# Patient Record
Sex: FEMALE | Race: BLACK OR AFRICAN AMERICAN | NOT HISPANIC OR LATINO | Employment: FULL TIME | URBAN - METROPOLITAN AREA
[De-identification: names, ages, dates, MRNs, and addresses within clinical notes are randomized per-mention and may not be internally consistent; named-entity substitution may affect disease eponyms.]

---

## 2019-03-07 ENCOUNTER — HOSPITAL ENCOUNTER (EMERGENCY)
Facility: HOSPITAL | Age: 39
Discharge: HOME | End: 2019-03-07
Attending: EMERGENCY MEDICINE
Payer: MEDICAID

## 2019-03-07 VITALS
DIASTOLIC BLOOD PRESSURE: 79 MMHG | BODY MASS INDEX: 29.86 KG/M2 | RESPIRATION RATE: 20 BRPM | TEMPERATURE: 100 F | HEART RATE: 100 BPM | SYSTOLIC BLOOD PRESSURE: 125 MMHG | WEIGHT: 158.19 LBS | HEIGHT: 61 IN

## 2019-03-07 DIAGNOSIS — J06.9 VIRAL UPPER RESPIRATORY INFECTION: Primary | ICD-10-CM

## 2019-03-07 LAB
INFLUENZA A, MOLECULAR: NEGATIVE
INFLUENZA B, MOLECULAR: NEGATIVE
SPECIMEN SOURCE: NORMAL

## 2019-03-07 PROCEDURE — 99284 EMERGENCY DEPT VISIT MOD MDM: CPT | Mod: ER

## 2019-03-07 PROCEDURE — 25000003 PHARM REV CODE 250: Mod: ER | Performed by: EMERGENCY MEDICINE

## 2019-03-07 PROCEDURE — 87502 INFLUENZA DNA AMP PROBE: CPT | Mod: ER

## 2019-03-07 RX ORDER — NAPROXEN 500 MG/1
500 TABLET ORAL
Status: COMPLETED | OUTPATIENT
Start: 2019-03-07 | End: 2019-03-07

## 2019-03-07 RX ORDER — NAPROXEN 500 MG/1
500 TABLET ORAL 2 TIMES DAILY PRN
Qty: 10 TABLET | Refills: 1 | Status: SHIPPED | OUTPATIENT
Start: 2019-03-07 | End: 2019-03-12

## 2019-03-07 RX ORDER — PROMETHAZINE HYDROCHLORIDE AND CODEINE PHOSPHATE 6.25; 1 MG/5ML; MG/5ML
5 SOLUTION ORAL EVERY 4 HOURS PRN
Qty: 120 ML | Refills: 0 | Status: SHIPPED | OUTPATIENT
Start: 2019-03-07 | End: 2019-03-14

## 2019-03-07 RX ORDER — ACETAMINOPHEN 500 MG
1000 TABLET ORAL
Status: COMPLETED | OUTPATIENT
Start: 2019-03-07 | End: 2019-03-07

## 2019-03-07 RX ADMIN — NAPROXEN 500 MG: 500 TABLET ORAL at 09:03

## 2019-03-07 RX ADMIN — ACETAMINOPHEN 1000 MG: 500 TABLET ORAL at 09:03

## 2019-03-07 NOTE — ED NOTES
Aaox3, skin warm and dry, resp unlabored and even. amb with with steady gait and lawrence well. C/o achy all over, headache , congestion and some vomiting interm since yest.no active vomiting while in triage.

## 2019-03-07 NOTE — ED PROVIDER NOTES
Encounter Date: 3/7/2019       History     Chief Complaint   Patient presents with    flu like symptoms.     cough, congestion, headache , achy all over since yest.      Mild flu-like symptoms since yesterday, did not get the flu vaccine.  No definite fever or dyspnea.  Mild to moderate cough.  Works as a , scheduled to work tomorrow, will need a note.  Has had tubal ligation.  No other complaints.      The history is provided by the patient. No  was used.     Review of patient's allergies indicates:  No Known Allergies  History reviewed. No pertinent past medical history.  History reviewed. No pertinent surgical history.  History reviewed. No pertinent family history.  Social History     Tobacco Use    Smoking status: Never Smoker    Smokeless tobacco: Never Used   Substance Use Topics    Alcohol use: No     Frequency: Never    Drug use: No     Review of Systems   Constitutional: Negative for activity change, fatigue and fever.   HENT: Negative for congestion, ear pain, facial swelling, nosebleeds, sinus pressure and sore throat.    Eyes: Negative for pain, discharge, redness and visual disturbance.   Respiratory: Positive for cough. Negative for choking, chest tightness, shortness of breath and wheezing.    Cardiovascular: Negative for chest pain, palpitations and leg swelling.   Gastrointestinal: Negative for abdominal distention, abdominal pain, nausea and vomiting.   Endocrine: Negative for heat intolerance, polydipsia and polyuria.   Genitourinary: Negative for difficulty urinating, dysuria, flank pain, hematuria and urgency.   Musculoskeletal: Positive for arthralgias and myalgias. Negative for back pain, gait problem and joint swelling.   Skin: Negative for color change and rash.   Allergic/Immunologic: Negative for environmental allergies and food allergies.   Neurological: Positive for headaches. Negative for dizziness, weakness and numbness.   Hematological:  Negative for adenopathy. Does not bruise/bleed easily.   Psychiatric/Behavioral: Negative for agitation and behavioral problems. The patient is not nervous/anxious.    All other systems reviewed and are negative.      Physical Exam     Initial Vitals [03/07/19 0917]   BP Pulse Resp Temp SpO2   125/79 100 20 99.8 °F (37.7 °C) --      MAP       --         Physical Exam    Nursing note and vitals reviewed.  Constitutional: She appears well-developed and well-nourished. She is not diaphoretic. No distress.   Mildly congested; mildly uncomfortable; mild cough   HENT:   Head: Normocephalic and atraumatic.   Mouth/Throat: No oropharyngeal exudate.   Eyes: Conjunctivae and EOM are normal. Pupils are equal, round, and reactive to light. Right eye exhibits no discharge. Left eye exhibits no discharge. No scleral icterus.   Neck: Normal range of motion. Neck supple. No thyromegaly present. No tracheal deviation present. No JVD present.   Cardiovascular: Normal rate, regular rhythm, normal heart sounds and intact distal pulses. Exam reveals no gallop and no friction rub.    No murmur heard.  Pulmonary/Chest: Breath sounds normal. No stridor. No respiratory distress. She has no wheezes. She has no rhonchi. She has no rales. She exhibits no tenderness.   Abdominal: Soft. Bowel sounds are normal. She exhibits no distension and no mass. There is no tenderness. There is no rebound and no guarding.   Musculoskeletal: Normal range of motion. She exhibits no edema or tenderness.   Neurological: She is alert and oriented to person, place, and time. She has normal strength.   Skin: Skin is warm and dry. No rash and no abscess noted. No erythema.   Psychiatric: She has a normal mood and affect. Her behavior is normal. Judgment and thought content normal.         ED Course   Procedures  Labs Reviewed   INFLUENZA A & B BY MOLECULAR          Imaging Results    None                               Clinical Impression:       1. Viral upper  respiratory infection            Disposition:   Disposition: Discharged  Condition: Stable                        Mart Canales MD  03/07/19 0951

## 2019-09-12 ENCOUNTER — HOSPITAL ENCOUNTER (OUTPATIENT)
Facility: HOSPITAL | Age: 39
Discharge: HOME | End: 2019-09-13
Attending: EMERGENCY MEDICINE | Admitting: FAMILY MEDICINE
Payer: MEDICAID

## 2019-09-12 DIAGNOSIS — R10.13 EPIGASTRIC PAIN: ICD-10-CM

## 2019-09-12 DIAGNOSIS — D21.9 FIBROIDS: ICD-10-CM

## 2019-09-12 DIAGNOSIS — K80.20 GALLSTONES: ICD-10-CM

## 2019-09-12 DIAGNOSIS — D64.9 ANEMIA, UNSPECIFIED TYPE: Primary | ICD-10-CM

## 2019-09-12 DIAGNOSIS — K80.50 BILIARY COLIC: ICD-10-CM

## 2019-09-12 LAB
ALBUMIN SERPL BCP-MCNC: 4.6 G/DL (ref 3.5–5.2)
ALP SERPL-CCNC: 78 U/L (ref 55–135)
ALT SERPL W/O P-5'-P-CCNC: 10 U/L (ref 10–44)
ANION GAP SERPL CALC-SCNC: 9 MMOL/L (ref 8–16)
ANISOCYTOSIS BLD QL SMEAR: ABNORMAL
AST SERPL-CCNC: 15 U/L (ref 10–40)
B-HCG UR QL: NEGATIVE
BASOPHILS # BLD AUTO: 0.04 K/UL (ref 0–0.2)
BASOPHILS NFR BLD: 0.3 % (ref 0–1.9)
BILIRUB SERPL-MCNC: 0.3 MG/DL (ref 0.1–1)
BILIRUB UR QL STRIP: NEGATIVE
BUN SERPL-MCNC: 7 MG/DL (ref 6–20)
CALCIUM SERPL-MCNC: 10.1 MG/DL (ref 8.7–10.5)
CHLORIDE SERPL-SCNC: 104 MMOL/L (ref 95–110)
CLARITY UR REFRACT.AUTO: CLEAR
CO2 SERPL-SCNC: 22 MMOL/L (ref 23–29)
COLOR UR AUTO: YELLOW
CREAT SERPL-MCNC: 0.7 MG/DL (ref 0.5–1.4)
DIFFERENTIAL METHOD: ABNORMAL
EOSINOPHIL # BLD AUTO: 0.1 K/UL (ref 0–0.5)
EOSINOPHIL NFR BLD: 0.6 % (ref 0–8)
ERYTHROCYTE [DISTWIDTH] IN BLOOD BY AUTOMATED COUNT: 22 % (ref 11.5–14.5)
EST. GFR  (AFRICAN AMERICAN): >60 ML/MIN/1.73 M^2
EST. GFR  (NON AFRICAN AMERICAN): >60 ML/MIN/1.73 M^2
GLUCOSE SERPL-MCNC: 104 MG/DL (ref 70–110)
GLUCOSE UR QL STRIP: NEGATIVE
HCT VFR BLD AUTO: 22.9 % (ref 37–48.5)
HGB BLD-MCNC: 6.1 G/DL (ref 12–16)
HGB UR QL STRIP: NEGATIVE
HYPOCHROMIA BLD QL SMEAR: ABNORMAL
KETONES UR QL STRIP: NEGATIVE
LEUKOCYTE ESTERASE UR QL STRIP: NEGATIVE
LIPASE SERPL-CCNC: 27 U/L (ref 4–60)
LYMPHOCYTES # BLD AUTO: 2.7 K/UL (ref 1–4.8)
LYMPHOCYTES NFR BLD: 19.9 % (ref 18–48)
MCH RBC QN AUTO: 15.4 PG (ref 27–31)
MCHC RBC AUTO-ENTMCNC: 26.6 G/DL (ref 32–36)
MCV RBC AUTO: 58 FL (ref 82–98)
MONOCYTES # BLD AUTO: 0.8 K/UL (ref 0.3–1)
MONOCYTES NFR BLD: 5.6 % (ref 4–15)
NEUTROPHILS # BLD AUTO: 9.9 K/UL (ref 1.8–7.7)
NEUTROPHILS NFR BLD: 73.6 % (ref 38–73)
NITRITE UR QL STRIP: NEGATIVE
OVALOCYTES BLD QL SMEAR: ABNORMAL
PH UR STRIP: 7 [PH] (ref 5–8)
PLATELET # BLD AUTO: 471 K/UL (ref 150–350)
PMV BLD AUTO: ABNORMAL FL (ref 9.2–12.9)
POTASSIUM SERPL-SCNC: 3.6 MMOL/L (ref 3.5–5.1)
PROT SERPL-MCNC: 8.9 G/DL (ref 6–8.4)
PROT UR QL STRIP: NEGATIVE
RBC # BLD AUTO: 3.97 M/UL (ref 4–5.4)
SCHISTOCYTES BLD QL SMEAR: ABNORMAL
SODIUM SERPL-SCNC: 135 MMOL/L (ref 136–145)
SP GR UR STRIP: 1.02 (ref 1–1.03)
URN SPEC COLLECT METH UR: NORMAL
UROBILINOGEN UR STRIP-ACNC: <2 EU/DL
WBC # BLD AUTO: 13.55 K/UL (ref 3.9–12.7)

## 2019-09-12 PROCEDURE — 85025 COMPLETE CBC W/AUTO DIFF WBC: CPT | Mod: ER

## 2019-09-12 PROCEDURE — 81025 URINE PREGNANCY TEST: CPT | Mod: ER

## 2019-09-12 PROCEDURE — 83690 ASSAY OF LIPASE: CPT | Mod: ER

## 2019-09-12 PROCEDURE — 80053 COMPREHEN METABOLIC PANEL: CPT | Mod: ER

## 2019-09-12 PROCEDURE — 63600175 PHARM REV CODE 636 W HCPCS: Mod: ER | Performed by: EMERGENCY MEDICINE

## 2019-09-12 PROCEDURE — 81003 URINALYSIS AUTO W/O SCOPE: CPT | Mod: ER

## 2019-09-12 RX ORDER — ONDANSETRON 2 MG/ML
4 INJECTION INTRAMUSCULAR; INTRAVENOUS
Status: COMPLETED | OUTPATIENT
Start: 2019-09-12 | End: 2019-09-12

## 2019-09-12 RX ORDER — MORPHINE SULFATE 4 MG/ML
4 INJECTION, SOLUTION INTRAMUSCULAR; INTRAVENOUS
Status: COMPLETED | OUTPATIENT
Start: 2019-09-12 | End: 2019-09-12

## 2019-09-12 RX ADMIN — MORPHINE SULFATE 4 MG: 4 INJECTION, SOLUTION INTRAMUSCULAR; INTRAVENOUS at 11:09

## 2019-09-12 RX ADMIN — SODIUM CHLORIDE 1000 ML: 0.9 INJECTION, SOLUTION INTRAVENOUS at 11:09

## 2019-09-12 RX ADMIN — ONDANSETRON 4 MG: 2 INJECTION INTRAMUSCULAR; INTRAVENOUS at 11:09

## 2019-09-12 RX ADMIN — IOHEXOL 100 ML: 350 INJECTION, SOLUTION INTRAVENOUS at 11:09

## 2019-09-13 VITALS
HEART RATE: 69 BPM | WEIGHT: 157.19 LBS | SYSTOLIC BLOOD PRESSURE: 152 MMHG | DIASTOLIC BLOOD PRESSURE: 84 MMHG | BODY MASS INDEX: 29.7 KG/M2 | RESPIRATION RATE: 16 BRPM | TEMPERATURE: 99 F | OXYGEN SATURATION: 100 %

## 2019-09-13 PROBLEM — D50.0 CHRONIC BLOOD LOSS ANEMIA: Status: ACTIVE | Noted: 2019-09-13

## 2019-09-13 PROBLEM — N13.30 HYDRONEPHROSIS: Status: ACTIVE | Noted: 2019-09-13

## 2019-09-13 PROBLEM — K21.9 GERD (GASTROESOPHAGEAL REFLUX DISEASE): Status: ACTIVE | Noted: 2019-09-13

## 2019-09-13 PROBLEM — K80.20 GALLSTONES: Status: ACTIVE | Noted: 2019-09-13

## 2019-09-13 PROBLEM — D64.9 ANEMIA: Status: ACTIVE | Noted: 2019-09-13

## 2019-09-13 PROBLEM — D21.9 FIBROIDS: Status: ACTIVE | Noted: 2019-09-13

## 2019-09-13 PROBLEM — R10.13 EPIGASTRIC PAIN: Status: RESOLVED | Noted: 2019-09-13 | Resolved: 2019-09-13

## 2019-09-13 PROBLEM — R10.13 EPIGASTRIC PAIN: Status: ACTIVE | Noted: 2019-09-13

## 2019-09-13 PROBLEM — D50.9 MICROCYTIC ANEMIA: Status: ACTIVE | Noted: 2019-09-13

## 2019-09-13 LAB
ABO + RH BLD: NORMAL
ANISOCYTOSIS BLD QL SMEAR: SLIGHT
BASOPHILS # BLD AUTO: 0.06 K/UL (ref 0–0.2)
BASOPHILS NFR BLD: 0.6 % (ref 0–1.9)
BLD GP AB SCN CELLS X3 SERPL QL: NORMAL
BLD PROD TYP BPU: NORMAL
BLD PROD TYP BPU: NORMAL
BLOOD UNIT EXPIRATION DATE: NORMAL
BLOOD UNIT EXPIRATION DATE: NORMAL
BLOOD UNIT TYPE CODE: 6200
BLOOD UNIT TYPE CODE: 6200
BLOOD UNIT TYPE: NORMAL
BLOOD UNIT TYPE: NORMAL
CODING SYSTEM: NORMAL
CODING SYSTEM: NORMAL
DACRYOCYTES BLD QL SMEAR: ABNORMAL
DIFFERENTIAL METHOD: ABNORMAL
DISPENSE STATUS: NORMAL
DISPENSE STATUS: NORMAL
EOSINOPHIL # BLD AUTO: 0.1 K/UL (ref 0–0.5)
EOSINOPHIL NFR BLD: 0.7 % (ref 0–8)
ERYTHROCYTE [DISTWIDTH] IN BLOOD BY AUTOMATED COUNT: 26.6 % (ref 11.5–14.5)
HCT VFR BLD AUTO: 29.7 % (ref 37–48.5)
HGB BLD-MCNC: 8.6 G/DL (ref 12–16)
HYPOCHROMIA BLD QL SMEAR: ABNORMAL
LYMPHOCYTES # BLD AUTO: 3.1 K/UL (ref 1–4.8)
LYMPHOCYTES NFR BLD: 31.1 % (ref 18–48)
MCH RBC QN AUTO: 19 PG (ref 27–31)
MCHC RBC AUTO-ENTMCNC: 29 G/DL (ref 32–36)
MCV RBC AUTO: 66 FL (ref 82–98)
MONOCYTES # BLD AUTO: 0.9 K/UL (ref 0.3–1)
MONOCYTES NFR BLD: 8.5 % (ref 4–15)
NEUTROPHILS # BLD AUTO: 5.9 K/UL (ref 1.8–7.7)
NEUTROPHILS NFR BLD: 59.3 % (ref 38–73)
NUM UNITS TRANS PACKED RBC: NORMAL
NUM UNITS TRANS PACKED RBC: NORMAL
OVALOCYTES BLD QL SMEAR: ABNORMAL
PLATELET # BLD AUTO: 318 K/UL (ref 150–350)
PMV BLD AUTO: ABNORMAL FL (ref 9.2–12.9)
POIKILOCYTOSIS BLD QL SMEAR: ABNORMAL
RBC # BLD AUTO: 4.52 M/UL (ref 4–5.4)
SCHISTOCYTES BLD QL SMEAR: PRESENT
SPHEROCYTES BLD QL SMEAR: ABNORMAL
STOMATOCYTES BLD QL SMEAR: PRESENT
TARGETS BLD QL SMEAR: ABNORMAL
WBC # BLD AUTO: 9.99 K/UL (ref 3.9–12.7)

## 2019-09-13 PROCEDURE — 86850 RBC ANTIBODY SCREEN: CPT

## 2019-09-13 PROCEDURE — 99203 OFFICE O/P NEW LOW 30 MIN: CPT | Mod: ,,, | Performed by: SURGERY

## 2019-09-13 PROCEDURE — 99203 PR OFFICE/OUTPT VISIT, NEW, LEVL III, 30-44 MIN: ICD-10-PCS | Mod: ,,, | Performed by: SURGERY

## 2019-09-13 PROCEDURE — 86920 COMPATIBILITY TEST SPIN: CPT

## 2019-09-13 PROCEDURE — 63600175 PHARM REV CODE 636 W HCPCS: Performed by: FAMILY MEDICINE

## 2019-09-13 PROCEDURE — 96374 THER/PROPH/DIAG INJ IV PUSH: CPT

## 2019-09-13 PROCEDURE — 25500020 PHARM REV CODE 255: Mod: ER | Performed by: EMERGENCY MEDICINE

## 2019-09-13 PROCEDURE — P9016 RBC LEUKOCYTES REDUCED: HCPCS

## 2019-09-13 PROCEDURE — 96375 TX/PRO/DX INJ NEW DRUG ADDON: CPT

## 2019-09-13 PROCEDURE — 96361 HYDRATE IV INFUSION ADD-ON: CPT

## 2019-09-13 PROCEDURE — G0378 HOSPITAL OBSERVATION PER HR: HCPCS

## 2019-09-13 PROCEDURE — 25000003 PHARM REV CODE 250: Performed by: NURSE PRACTITIONER

## 2019-09-13 PROCEDURE — 85025 COMPLETE CBC W/AUTO DIFF WBC: CPT

## 2019-09-13 PROCEDURE — 99291 CRITICAL CARE FIRST HOUR: CPT | Mod: 25

## 2019-09-13 RX ORDER — HYDROCODONE BITARTRATE AND ACETAMINOPHEN 500; 5 MG/1; MG/1
TABLET ORAL
Status: DISCONTINUED | OUTPATIENT
Start: 2019-09-13 | End: 2019-09-13 | Stop reason: HOSPADM

## 2019-09-13 RX ORDER — OMEPRAZOLE 40 MG/1
40 CAPSULE, DELAYED RELEASE ORAL DAILY
Qty: 30 CAPSULE | Refills: 1 | Status: SHIPPED | OUTPATIENT
Start: 2019-09-13 | End: 2020-09-12

## 2019-09-13 RX ORDER — ACETAMINOPHEN 325 MG/1
650 TABLET ORAL EVERY 6 HOURS PRN
Status: DISCONTINUED | OUTPATIENT
Start: 2019-09-13 | End: 2019-09-13 | Stop reason: HOSPADM

## 2019-09-13 RX ORDER — FERROUS SULFATE 325(65) MG
325 TABLET ORAL DAILY
Qty: 60 TABLET | Refills: 1 | Status: SHIPPED | OUTPATIENT
Start: 2019-09-13 | End: 2019-11-13

## 2019-09-13 RX ORDER — FAMOTIDINE 20 MG/1
20 TABLET, FILM COATED ORAL 2 TIMES DAILY
Status: DISCONTINUED | OUTPATIENT
Start: 2019-09-13 | End: 2019-09-13 | Stop reason: HOSPADM

## 2019-09-13 RX ORDER — DIPHENHYDRAMINE HCL 25 MG
25 CAPSULE ORAL EVERY 6 HOURS PRN
Status: DISCONTINUED | OUTPATIENT
Start: 2019-09-13 | End: 2019-09-13 | Stop reason: HOSPADM

## 2019-09-13 RX ORDER — HYDROCODONE BITARTRATE AND ACETAMINOPHEN 5; 325 MG/1; MG/1
1 TABLET ORAL EVERY 6 HOURS PRN
Status: DISCONTINUED | OUTPATIENT
Start: 2019-09-13 | End: 2019-09-13 | Stop reason: HOSPADM

## 2019-09-13 RX ORDER — ONDANSETRON 2 MG/ML
4 INJECTION INTRAMUSCULAR; INTRAVENOUS EVERY 6 HOURS PRN
Status: DISCONTINUED | OUTPATIENT
Start: 2019-09-13 | End: 2019-09-13 | Stop reason: HOSPADM

## 2019-09-13 RX ADMIN — FAMOTIDINE 20 MG: 20 TABLET, FILM COATED ORAL at 03:09

## 2019-09-13 RX ADMIN — SODIUM CHLORIDE: 0.9 INJECTION, SOLUTION INTRAVENOUS at 07:09

## 2019-09-13 NOTE — ED NOTES
Pt asleep in bed in no acute distress. Vital signs stable. Respirations even and unlabored. Call bell in reach. Bed in lowest position. Side rails up x2. Will continue to monitor.

## 2019-09-13 NOTE — ED NOTES
Report received from RICKIE Mancera. Care of patient assumed at this time. The patient is resting quietly, eyes closed, arouses easily to stimuli. Airway is open and patent, respirations are spontaneous, normal respiratory effort and rate noted, skin warm and dry, appearance: in no acute distress and resting comfortably.

## 2019-09-13 NOTE — DISCHARGE SUMMARY
[facility] Hospital Medicine  Discharge Summary      Patient Name: Sameko Collette Miller  MRN: [id number]  Admission Date: 9/12/2019  Hospital Length of Stay: 0 days  Discharge Date and Time:  09/13/2019 5:08 PM  Attending Physician: Agustín Duffy MD  Discharging Provider: Lisa Tamayo NP  Primary Care Provider: Primary Doctor No      HPI:   37 yo BF who presented c/o epigastric pain for about 3 weeks. Her pain has no relationship to food and mostly at night when she goes to bed. Per the patient she was told in the past she has gallstones but hasn't had any known issues. She denies any other GI symptoms such as heartburn/indegestion. She also admits to being fatigue and SOB when she over exerts herself. She was found with a H/H 6/21 and is aware of uterine fibroids despite what she describes as a normal menstrual cycle.    * No surgery found *      Hospital Course:   Pt presented due to epigastric pain and during work up found to have chronic blood loss anemia. US imaging confirmed gallstone and possible cholecystitis. General Surgery, Dr. Xie, saw the patient - pt had no further abdominal pain and cholecystitis was ruled out. Pt stated she was aware of having fibroids. She denied any symptoms of SOB, palpitations, weakness, chest pain and headaches. She was given 2 units of PRBCs and her Hgb improved 8.6/Hct 29.7. Pt desired discharge to home, she was asymptomatic. She was prescribed omeprazole for presumed GERD. Also, she was prescribed iron supplement. Pt was advised regarding follow up with both Gynecology - Dr. Sanchez, PCP and Dr. Garza, General Surgery. She was seen and examined and determined to be safe and stable for discharge.        Consults:   Consults (From admission, onward)        Status Ordering Provider     Inpatient consult to General Surgery  Once     Provider:  (Not yet assigned)    Completed LISA TAMAYO          No new Assessment & Plan notes have been filed  under this hospital service since the last note was generated.  Service: Hospital Medicine    Final Active Diagnoses:    Diagnosis Date Noted POA    Microcytic anemia [D50.9] 09/13/2019 Yes    GERD (gastroesophageal reflux disease) [K21.9] 09/13/2019 Yes    Gallstones [K80.20] 09/13/2019 Yes    Fibroids [D21.9] 09/13/2019 Yes    Hydronephrosis [N13.30] 09/13/2019 Yes    Chronic blood loss anemia [D50.0] 09/13/2019 Yes      Problems Resolved During this Admission:    Diagnosis Date Noted Date Resolved POA    PRINCIPAL PROBLEM:  Epigastric pain [R10.13] 09/13/2019 09/13/2019 Yes       Discharged Condition: stable    Disposition: Home or Self Care    Follow Up:  Follow-up Information     Malena Sanchez MD In 2 weeks.    Specialties:  Obstetrics, Obstetrics and Gynecology  Why:  Hospital Follow Up - Fibroid, heavy menstrual cycle and iron deficiency anemia  Contact information:  [address] [place] [address] [address] [place] [address]  [phone]             Yessica Xie MD In 2 weeks.    Specialty:  General Surgery  Why:  Hospital Follow Up - Gallstone - possible need for gallbladder removal  Contact information:  [address]  [place] [address]  [phone]                 Patient Instructions:      Notify your health care provider if you experience any of the following:  temperature >100.4     Notify your health care provider if you experience any of the following:  persistent nausea and vomiting or diarrhea     Notify your health care provider if you experience any of the following:  severe uncontrolled pain     Notify your health care provider if you experience any of the following:  difficulty breathing or increased cough     Notify your health care provider if you experience any of the following:  persistent dizziness, light-headedness, or visual disturbances     Activity as tolerated       Significant Diagnostic Studies: Labs:   CMP   Recent Labs   Lab 09/12/19  2259   *   K 3.6       CO2 22*      BUN 7   CREATININE 0.7   CALCIUM 10.1   PROT 8.9*   ALBUMIN 4.6   BILITOT 0.3   ALKPHOS 78   AST 15   ALT 10   ANIONGAP 9   ESTGFRAFRICA >60.0   EGFRNONAA >60.0    and CBC   Recent Labs   Lab 09/12/19  2259 09/13/19  1454   WBC 13.55* 9.99   HGB 6.1* 8.6*   HCT 22.9* 29.7*   * 318       Pending Diagnostic Studies:     None         Medications:  Reconciled Home Medications:      Medication List      START taking these medications    ferrous sulfate 325 mg (65 mg iron) Tab tablet  Commonly known as:  FEOSOL  Take 1 tablet (325 mg total) by mouth once daily.     omeprazole 40 MG capsule  Commonly known as:  PRILOSEC  Take 1 capsule (40 mg total) by mouth once daily.            Indwelling Lines/Drains at time of discharge:   Lines/Drains/Airways          None          Time spent on the discharge of patient: > 30 minutes  Patient was seen and examined on the date of discharge and determined to be suitable for discharge.         Lisa Tamayo NP  Department of Hospital Medicine  [facility]

## 2019-09-13 NOTE — ED NOTES
Patient updated on plan of care by MD. Pt in nad. Resting comfortably on stretcher. Family at bedside. Call light within reach. Will continue to monitor.

## 2019-09-13 NOTE — ASSESSMENT & PLAN NOTE
There is no relationship to food and mostly at night makes me wonder if she may have GERD. Will try PPI as she hasn't taken anything except gas X.

## 2019-09-13 NOTE — HOSPITAL COURSE
Pt presented due to epigastric pain and during work up found to have chronic blood loss anemia. US imaging confirmed gallstone and possible cholecystitis. General Surgery, Dr. Xie, saw the patient - pt had no further abdominal pain and cholecystitis was ruled out. Pt stated she was aware of having fibroids. She denied any symptoms of SOB, palpitations, weakness, chest pain and headaches. She was given 2 units of PRBCs and her Hgb improved 8.6/Hct 29.7. Pt desired discharge to home, she was asymptomatic. She was prescribed omeprazole for presumed GERD. Also, she was prescribed iron supplement. Pt was advised regarding follow up with both Gynecology - Dr. Sanchez, PCP and Dr. Garza, General Surgery. She was seen and examined and determined to be safe and stable for discharge.

## 2019-09-13 NOTE — ED NOTES
Patient verbalized understanding of need to be transferred. Denies questions or concerns. Awaiting AASI.  Family at bedside. Call light within reach. Will continue to monitor.

## 2019-09-13 NOTE — HPI
37 yo BF who presented c/o epigastric pain for about 3 weeks. Her pain has no relationship to food and mostly at night when she goes to bed. Per the patient she was told in the past she has gallstones but hasn't had any known issues. She denies any other GI symptoms such as heartburn/indegestion. She also admits to being fatigue and SOB when she over exerts herself. She was found with a H/H 6/21 and is aware of uterine fibroids despite what she describes as a normal menstrual cycle.

## 2019-09-13 NOTE — ASSESSMENT & PLAN NOTE
Patient is currently asymptomatic with no pain.  She will need evaluation by GYN and likely receive an operation for her fibroids causing her anemia.  I can coordinate to perform cholecystectomy at the time of her GYN operation.  F/u in my clinic after GYN evaluation.

## 2019-09-13 NOTE — HPI
38-year-old female with known history of cholelithiasis and uterine fibroids presented to the ER with intermittent epigastric abdominal pain associated with food.  She states that the pain occurs only at night and resides after approximately 2 hr.  It is associated with nausea and bilious emesis.  She denies any dizziness or weakness.  CT abdomen and pelvis showed an enlarged gallbladder.  Further evaluation with ultrasound showed mild amount of pericholecystic fluid and wall thickening with cholelithiasis.  The patient states that her pain has since resolved and she has had no further nausea after transfer from the outside hospital.  She was noted to be anemic and is currently being transfused 2 units packed red cells.  She denies fevers or chills.  She was noted to have normal LFTs and bilirubin level. Surgery was consulted for evaluation.

## 2019-09-13 NOTE — SUBJECTIVE & OBJECTIVE
History reviewed. No pertinent past medical history.    History reviewed. No pertinent surgical history.    Review of patient's allergies indicates:  No Known Allergies    No current facility-administered medications on file prior to encounter.      No current outpatient medications on file prior to encounter.     Family History     None        Tobacco Use    Smoking status: Never Smoker    Smokeless tobacco: Never Used   Substance and Sexual Activity    Alcohol use: No     Frequency: Never    Drug use: No    Sexual activity: Not on file     Review of Systems   Constitutional: Positive for fatigue.   HENT: Negative.    Eyes: Negative.    Respiratory: Negative.    Cardiovascular: Negative.    Gastrointestinal: Positive for abdominal distention, abdominal pain and nausea.   Endocrine: Negative.    Genitourinary: Negative.    Musculoskeletal: Negative.    Skin: Negative.    Allergic/Immunologic: Negative.    Neurological: Negative.    Hematological: Negative.    Psychiatric/Behavioral: Negative.      Objective:     Vital Signs (Most Recent):  Temp: 98.9 °F (37.2 °C) (09/13/19 0332)  Pulse: 77 (09/13/19 0332)  Resp: 18 (09/13/19 0236)  BP: 117/61 (09/13/19 0332)  SpO2: 100 % (09/13/19 0332) Vital Signs (24h Range):  Temp:  [98.6 °F (37 °C)-98.9 °F (37.2 °C)] 98.9 °F (37.2 °C)  Pulse:  [76-86] 77  Resp:  [18] 18  SpO2:  [100 %] 100 %  BP: (117-146)/(61-86) 117/61     Weight: 71.3 kg (157 lb 3 oz)  Body mass index is 29.7 kg/m².    Physical Exam   Constitutional: She is oriented to person, place, and time. She appears well-developed and well-nourished.   HENT:   Head: Normocephalic and atraumatic.   Right Ear: External ear normal.   Left Ear: External ear normal.   Nose: Nose normal.   Mouth/Throat: Oropharynx is clear and moist. No oropharyngeal exudate.   Eyes: Pupils are equal, round, and reactive to light. Conjunctivae and EOM are normal.   Neck: Normal range of motion. Neck supple.   Cardiovascular: Normal rate,  regular rhythm, normal heart sounds and intact distal pulses.   Pulmonary/Chest: Effort normal and breath sounds normal.   Abdominal: Soft. Bowel sounds are normal. She exhibits no distension. There is tenderness in the epigastric area.   Neurological: She is alert and oriented to person, place, and time.   Skin: Skin is warm and dry. Capillary refill takes 2 to 3 seconds.   Psychiatric: She has a normal mood and affect.   Nursing note and vitals reviewed.        CRANIAL NERVES     CN III, IV, VI   Pupils are equal, round, and reactive to light.  Extraocular motions are normal.        Significant Labs:   Recent Lab Results       09/12/19 2259 09/12/19 2254        Albumin 4.6       Alkaline Phosphatase 78       ALT 10       Anion Gap 9       Aniso Moderate       Appearance, UA   Clear     AST 15       Baso # 0.04       Basophil% 0.3       Bilirubin (UA)   Negative     BILIRUBIN TOTAL 0.3  Comment:  For infants and newborns, interpretation of results should be based  on gestational age, weight and in agreement with clinical  observations.  Premature Infant recommended reference ranges:  Up to 24 hours.............<8.0 mg/dL  Up to 48 hours............<12.0 mg/dL  3-5 days..................<15.0 mg/dL  6-29 days.................<15.0 mg/dL         BUN, Bld 7       Calcium 10.1       Chloride 104       CO2 22       Color, UA   Yellow     Creatinine 0.7       Differential Method Automated       eGFR if  >60.0       eGFR if non  >60.0  Comment:  Calculation used to obtain the estimated glomerular filtration  rate (eGFR) is the CKD-EPI equation.          Eos # 0.1       Eosinophil% 0.6       Fragmented Cells Occasional       Glucose 104       Glucose, UA   Negative     Gran # (ANC) 9.9       Gran% 73.6       Hematocrit 22.9       Hemoglobin 6.1       Hypo Moderate       Ketones, UA   Negative     Leukocytes, UA   Negative     Lipase 27       Lymph # 2.7       Lymph% 19.9       MCH 15.4        MCHC 26.6       MCV 58       Mono # 0.8       Mono% 5.6       MPV SEE COMMENT  Comment:  Result not available.       NITRITE UA   Negative     Occult Blood UA   Negative     Ovalocytes Occasional       pH, UA   7.0     Platelets 471       Potassium 3.6       Preg Test, Ur   Negative     PROTEIN TOTAL 8.9       Protein, UA   Negative  Comment:  Recommend a 24 hour urine protein or a urine   protein/creatinine ratio if globulin induced proteinuria is  clinically suspected.       RBC 3.97       RDW 22.0       Sodium 135       Specific Gravity, UA   1.020     Specimen UA   Urine, Clean Catch     UROBILINOGEN UA   <2.0     WBC 13.55             Significant Imaging:   Imaging Results          CT Abdomen Pelvis With Contrast (In process)

## 2019-09-13 NOTE — ED NOTES
Patient identifies self as Sameko Collette Miller    LOC: The patient is awake, alert and aware of environment with an appropriate affect, the patient is oriented x 3 and speaking appropriately.  APPEARANCE: Patient resting comfortably and in no acute distress, patient is clean and well groomed, patient's clothing is properly fastened.  SKIN: The skin is warm and dry, color consistent with ethnicity, patient has normal skin turgor and moist mucus membranes, skin intact, no breakdown or bruising noted.  MUSCULOSKELETAL: Patient moving all extremities well, no obvious swelling or deformities noted.  RESPIRATORY: Airway is open and patent, respirations are spontaneous, patient has a normal effort and rate, no accessory muscle use noted.  CARDIAC: Patient has a normal rate and rhythm, no periphreal edema noted, capillary refill < 3 seconds.  ABDOMEN: Soft and non tender to palpation, no distention noted.  NEUROLOGIC: PERRL, 3 mm bilaterally, eyes open spontaneously, behavior appropriate to situation, follows commands, facial expression symmetrical, bilateral hand grasp equal and even, purposeful motor response noted, normal sensation in all extremities when touched with a finger.

## 2019-09-13 NOTE — ED NOTES
Spoke with Dr. Cotto about patients lab values. Patient will receive blood products tonight. Patient is to go to  ED.

## 2019-09-13 NOTE — SUBJECTIVE & OBJECTIVE
No current facility-administered medications on file prior to encounter.      No current outpatient medications on file prior to encounter.       Review of patient's allergies indicates:  No Known Allergies    History reviewed. No pertinent past medical history.  History reviewed. No pertinent surgical history.  Family History     None        Tobacco Use    Smoking status: Never Smoker    Smokeless tobacco: Never Used   Substance and Sexual Activity    Alcohol use: No     Frequency: Never    Drug use: No    Sexual activity: Not on file     Review of Systems   Constitutional: Negative for unexpected weight change.   HENT: Negative for congestion.    Eyes: Negative for visual disturbance.   Respiratory: Negative for shortness of breath.    Cardiovascular: Negative for chest pain.   Gastrointestinal: Positive for abdominal pain, nausea and vomiting. Negative for blood in stool.   Genitourinary: Positive for vaginal bleeding. Negative for difficulty urinating.   Skin: Negative for rash.   Allergic/Immunologic: Negative for immunocompromised state.   Neurological: Negative for weakness.   Psychiatric/Behavioral: The patient is not nervous/anxious.      Objective:     Vital Signs (Most Recent):  Temp: 98.5 °F (36.9 °C) (09/13/19 1336)  Pulse: 64 (09/13/19 1336)  Resp: 16 (09/13/19 1234)  BP: (!) 151/85 (09/13/19 1336)  SpO2: 100 % (09/13/19 1336) Vital Signs (24h Range):  Temp:  [98.1 °F (36.7 °C)-98.9 °F (37.2 °C)] 98.5 °F (36.9 °C)  Pulse:  [64-86] 64  Resp:  [15-20] 16  SpO2:  [98 %-100 %] 100 %  BP: (117-159)/(61-94) 151/85     Weight: 71.3 kg (157 lb 3 oz)  Body mass index is 29.7 kg/m².    Physical Exam   Constitutional: She is oriented to person, place, and time. She appears well-developed and well-nourished. No distress.   HENT:   Head: Normocephalic and atraumatic.   Eyes: EOM are normal. No scleral icterus.   Neck: Neck supple.   Cardiovascular: Normal rate and regular rhythm.   Pulmonary/Chest: Effort  normal.   Abdominal: Soft. She exhibits distension (Mild). There is no tenderness. There is no rebound and no guarding. No hernia.   Completely nontender with deep palpation of the epigastrium and right upper quadrant   Musculoskeletal: Normal range of motion.   Neurological: She is alert and oriented to person, place, and time.   Skin: Skin is warm.   Vitals reviewed.      Significant Labs:  CBC:   Recent Labs   Lab 09/12/19 2259   WBC 13.55*   RBC 3.97*   HGB 6.1*   HCT 22.9*   *   MCV 58*   MCH 15.4*   MCHC 26.6*     CMP:   Recent Labs   Lab 09/12/19 2259      CALCIUM 10.1   ALBUMIN 4.6   PROT 8.9*   *   K 3.6   CO2 22*      BUN 7   CREATININE 0.7   ALKPHOS 78   ALT 10   AST 15   BILITOT 0.3       Significant Diagnostics:  I have reviewed and interpreted all pertinent imaging results/findings within the past 24 hours.     Large fibroids.  Dilated gallbladder.  Mild wall thickening.

## 2019-09-13 NOTE — ED NOTES
Contacted [facility]. Notified RICKIE Mckeon, the patient does not need cardiac monitoring for transport. Verbalized understanding.

## 2019-09-13 NOTE — CONSULTS
[facility] -   General Surgery  Consult Note    Patient Name: Sameko Collette Miller  MRN: [id number]  Code Status: Full Code  Admission Date: 9/12/2019  Hospital Length of Stay: 0 days  Attending Physician: Agustín Black, *  Primary Care Provider: Primary Doctor No    Patient information was obtained from patient, spouse/SO and ER records.     Inpatient consult to General Surgery  Consult performed by: Yessica Xie MD  Consult ordered by: Lisa Tamayo NP        Subjective:     Principal Problem: Epigastric pain    History of Present Illness: 38-year-old female with known history of cholelithiasis and uterine fibroids presented to the ER with intermittent epigastric abdominal pain associated with food.  She states that the pain occurs only at night and resides after approximately 2 hr.  It is associated with nausea and bilious emesis.  She denies any dizziness or weakness.  CT abdomen and pelvis showed an enlarged gallbladder.  Further evaluation with ultrasound showed mild amount of pericholecystic fluid and wall thickening with cholelithiasis.  The patient states that her pain has since resolved and she has had no further nausea after transfer from the outside hospital.  She was noted to be anemic and is currently being transfused 2 units packed red cells.  She denies fevers or chills.  She was noted to have normal LFTs and bilirubin level. Surgery was consulted for evaluation.    No current facility-administered medications on file prior to encounter.      No current outpatient medications on file prior to encounter.       Review of patient's allergies indicates:  No Known Allergies    History reviewed. No pertinent past medical history.  History reviewed. No pertinent surgical history.  Family History     None        Tobacco Use    Smoking status: Never Smoker    Smokeless tobacco: Never Used   Substance and Sexual Activity    Alcohol use: No     Frequency: Never    Drug  use: No    Sexual activity: Not on file     Review of Systems   Constitutional: Negative for unexpected weight change.   HENT: Negative for congestion.    Eyes: Negative for visual disturbance.   Respiratory: Negative for shortness of breath.    Cardiovascular: Negative for chest pain.   Gastrointestinal: Positive for abdominal pain, nausea and vomiting. Negative for blood in stool.   Genitourinary: Positive for vaginal bleeding. Negative for difficulty urinating.   Skin: Negative for rash.   Allergic/Immunologic: Negative for immunocompromised state.   Neurological: Negative for weakness.   Psychiatric/Behavioral: The patient is not nervous/anxious.      Objective:     Vital Signs (Most Recent):  Temp: 98.5 °F (36.9 °C) (09/13/19 1336)  Pulse: 64 (09/13/19 1336)  Resp: 16 (09/13/19 1234)  BP: (!) 151/85 (09/13/19 1336)  SpO2: 100 % (09/13/19 1336) Vital Signs (24h Range):  Temp:  [98.1 °F (36.7 °C)-98.9 °F (37.2 °C)] 98.5 °F (36.9 °C)  Pulse:  [64-86] 64  Resp:  [15-20] 16  SpO2:  [98 %-100 %] 100 %  BP: (117-159)/(61-94) 151/85     Weight: 71.3 kg (157 lb 3 oz)  Body mass index is 29.7 kg/m².    Physical Exam   Constitutional: She is oriented to person, place, and time. She appears well-developed and well-nourished. No distress.   HENT:   Head: Normocephalic and atraumatic.   Eyes: EOM are normal. No scleral icterus.   Neck: Neck supple.   Cardiovascular: Normal rate and regular rhythm.   Pulmonary/Chest: Effort normal.   Abdominal: Soft. She exhibits distension (Mild). There is no tenderness. There is no rebound and no guarding. No hernia.   Completely nontender with deep palpation of the epigastrium and right upper quadrant   Musculoskeletal: Normal range of motion.   Neurological: She is alert and oriented to person, place, and time.   Skin: Skin is warm.   Vitals reviewed.      Significant Labs:  CBC:   Recent Labs   Lab 09/12/19  2259   WBC 13.55*   RBC 3.97*   HGB 6.1*   HCT 22.9*   *   MCV 58*   MCH  15.4*   MCHC 26.6*     CMP:   Recent Labs   Lab 09/12/19  2259      CALCIUM 10.1   ALBUMIN 4.6   PROT 8.9*   *   K 3.6   CO2 22*      BUN 7   CREATININE 0.7   ALKPHOS 78   ALT 10   AST 15   BILITOT 0.3       Significant Diagnostics:  I have reviewed and interpreted all pertinent imaging results/findings within the past 24 hours.     Large fibroids.  Dilated gallbladder.  Mild wall thickening.      Assessment/Plan:     * Epigastric pain  Patient is currently asymptomatic with no pain. She has not had pain medication since transfer. She does not currently have cholecystitis.   She will need evaluation by GYN and likely receive an operation for her fibroids causing her anemia.  I can coordinate to perform cholecystectomy at the time of her GYN operation.  F/u in my clinic after GYN evaluation.       VTE Risk Mitigation (From admission, onward)    None          Thank you for your consult. I will follow-up with patient. Please contact us if you have any additional questions.    Yessica Xie MD  General Surgery  [facility]

## 2019-09-13 NOTE — ED NOTES
Call received from Jared Tamayo NP to report pt will only need two units of Prbc's to transfuse. Draw CBC after infusion.

## 2019-09-13 NOTE — ED PROVIDER NOTES
Encounter Date: 9/12/2019       History     Chief Complaint   Patient presents with    Abdominal Cramping     epigastric cramping x2 weeks only at night. pt states shes tried gas x and naproxen with no relief.      Patient is a 38-year-old female who presents today with complaints epigastric abdominal pain. Patient states most nights for the past 2 weeks she has experienced this epigastric abdominal pain.  It is associated with vomiting. She does state that it occurs after her dinner.  She has been told in the past but she has gallstones on an ultrasound.  This was many years ago when she was pregnant.  She denies any fever/chills.  Denies dysuria, diarrhea, constipation, chest pain, shortness of breath, and all other symptoms.  She has had no prior evaluation.  She has tried home treatments with naproxen, ibuprofen, and over-the-counter gas medicine without any relief.  She is requesting pain medicine.        Review of patient's allergies indicates:  No Known Allergies  History reviewed. No pertinent past medical history.  History reviewed. No pertinent surgical history.  History reviewed. No pertinent family history.  Social History     Tobacco Use    Smoking status: Never Smoker    Smokeless tobacco: Never Used   Substance Use Topics    Alcohol use: No     Frequency: Never    Drug use: No     Review of Systems   Constitutional: Negative for chills, diaphoresis and fever.   HENT: Negative for congestion, rhinorrhea and sore throat.    Eyes: Negative for pain, redness and visual disturbance.   Respiratory: Negative for cough and shortness of breath.    Cardiovascular: Negative for chest pain, palpitations and leg swelling.   Gastrointestinal: Positive for abdominal pain (epigastric), nausea and vomiting. Negative for abdominal distention, blood in stool, constipation and diarrhea.   Genitourinary: Negative for dysuria, flank pain, hematuria, vaginal bleeding and vaginal discharge.   Musculoskeletal: Negative  for arthralgias and joint swelling.   Skin: Negative for rash and wound.   Neurological: Negative for seizures, syncope and headaches.   All other systems reviewed and are negative.      Physical Exam     Initial Vitals [09/12/19 2238]   BP Pulse Resp Temp SpO2   (!) 146/86 76 18 98.7 °F (37.1 °C) 100 %      MAP       --         Physical Exam    Nursing note and vitals reviewed.  Constitutional: She appears well-developed and well-nourished. No distress.   HENT:   Head: Normocephalic and atraumatic.   Mouth/Throat: Oropharynx is clear and moist.   Eyes: Conjunctivae and EOM are normal. Pupils are equal, round, and reactive to light.   Neck: Neck supple. No tracheal deviation present.   Cardiovascular: Normal rate, regular rhythm, normal heart sounds and intact distal pulses.   Pulmonary/Chest: Breath sounds normal. No respiratory distress.   Abdominal: Soft. She exhibits no distension. There is no tenderness (epigastric). There is no rebound and no guarding.   Negative Leblanc's, negative McBurney's   Musculoskeletal: Normal range of motion. She exhibits no edema or tenderness.   Neurological: She is alert and oriented to person, place, and time. GCS score is 15. GCS eye subscore is 4. GCS verbal subscore is 5. GCS motor subscore is 6.   No focal deficits   Skin: Skin is warm. No rash noted. No erythema.   Psychiatric: She has a normal mood and affect. Her behavior is normal.         ED Course   Critical Care  Date/Time: 9/13/2019 1:31 AM  Performed by: Marek Roberto MD  Authorized by: Marek Roberto MD   Direct patient critical care time: 10 minutes  Additional history critical care time: 5 minutes  Ordering / reviewing critical care time: 10 minutes  Documentation critical care time: 5 minutes  Consulting other physicians critical care time: 5 minutes  Total critical care time (exclusive of procedural time) : 35 minutes  Critical care time was exclusive of separately billable procedures and treating other  patients and teaching time.  Critical care was necessary to treat or prevent imminent or life-threatening deterioration of the following conditions: Anemia requiring transfer for blood transfusion.  Critical care was time spent personally by me on the following activities: blood draw for specimens, discussions with consultants, evaluation of patient's response to treatment, obtaining history from patient or surrogate, ordering and review of laboratory studies, pulse oximetry, re-evaluation of patient's condition, ordering and review of radiographic studies, ordering and performing treatments and interventions, examination of patient, interpretation of cardiac output measurements and development of treatment plan with patient or surrogate.        Labs Reviewed   CBC W/ AUTO DIFFERENTIAL - Abnormal; Notable for the following components:       Result Value    WBC 13.55 (*)     RBC 3.97 (*)     Hemoglobin 6.1 (*)     Hematocrit 22.9 (*)     Mean Corpuscular Volume 58 (*)     Mean Corpuscular Hemoglobin 15.4 (*)     Mean Corpuscular Hemoglobin Conc 26.6 (*)     RDW 22.0 (*)     Platelets 471 (*)     Gran # (ANC) 9.9 (*)     Gran% 73.6 (*)     All other components within normal limits   COMPREHENSIVE METABOLIC PANEL - Abnormal; Notable for the following components:    Sodium 135 (*)     CO2 22 (*)     Total Protein 8.9 (*)     All other components within normal limits   LIPASE   PREGNANCY TEST, URINE RAPID   URINALYSIS, REFLEX TO URINE CULTURE    Narrative:     Preferred Collection Type->Urine, Clean Catch     Results for orders placed or performed during the hospital encounter of 09/12/19   CBC auto differential   Result Value Ref Range    WBC 13.55 (H) 3.90 - 12.70 K/uL    RBC 3.97 (L) 4.00 - 5.40 M/uL    Hemoglobin 6.1 (L) 12.0 - 16.0 g/dL    Hematocrit 22.9 (L) 37.0 - 48.5 %    Mean Corpuscular Volume 58 (L) 82 - 98 fL    Mean Corpuscular Hemoglobin 15.4 (L) 27.0 - 31.0 pg    Mean Corpuscular Hemoglobin Conc 26.6 (L)  32.0 - 36.0 g/dL    RDW 22.0 (H) 11.5 - 14.5 %    Platelets 471 (H) 150 - 350 K/uL    MPV SEE COMMENT 9.2 - 12.9 fL    Gran # (ANC) 9.9 (H) 1.8 - 7.7 K/uL    Lymph # 2.7 1.0 - 4.8 K/uL    Mono # 0.8 0.3 - 1.0 K/uL    Eos # 0.1 0.0 - 0.5 K/uL    Baso # 0.04 0.00 - 0.20 K/uL    Gran% 73.6 (H) 38.0 - 73.0 %    Lymph% 19.9 18.0 - 48.0 %    Mono% 5.6 4.0 - 15.0 %    Eosinophil% 0.6 0.0 - 8.0 %    Basophil% 0.3 0.0 - 1.9 %    Aniso Moderate     Hypo Moderate     Ovalocytes Occasional     Fragmented Cells Occasional     Differential Method Automated    Comprehensive metabolic panel   Result Value Ref Range    Sodium 135 (L) 136 - 145 mmol/L    Potassium 3.6 3.5 - 5.1 mmol/L    Chloride 104 95 - 110 mmol/L    CO2 22 (L) 23 - 29 mmol/L    Glucose 104 70 - 110 mg/dL    BUN, Bld 7 6 - 20 mg/dL    Creatinine 0.7 0.5 - 1.4 mg/dL    Calcium 10.1 8.7 - 10.5 mg/dL    Total Protein 8.9 (H) 6.0 - 8.4 g/dL    Albumin 4.6 3.5 - 5.2 g/dL    Total Bilirubin 0.3 0.1 - 1.0 mg/dL    Alkaline Phosphatase 78 55 - 135 U/L    AST 15 10 - 40 U/L    ALT 10 10 - 44 U/L    Anion Gap 9 8 - 16 mmol/L    eGFR if African American >60.0 >60 mL/min/1.73 m^2    eGFR if non African American >60.0 >60 mL/min/1.73 m^2   Lipase   Result Value Ref Range    Lipase 27 4 - 60 U/L   Pregnancy, urine rapid   Result Value Ref Range    Preg Test, Ur Negative    Urinalysis, Reflex to Urine Culture Urine, Clean Catch   Result Value Ref Range    Specimen UA Urine, Clean Catch     Color, UA Yellow Yellow, Straw, Marla    Appearance, UA Clear Clear    pH, UA 7.0 5.0 - 8.0    Specific Gravity, UA 1.020 1.005 - 1.030    Protein, UA Negative Negative    Glucose, UA Negative Negative    Ketones, UA Negative Negative    Bilirubin (UA) Negative Negative    Occult Blood UA Negative Negative    Nitrite, UA Negative Negative    Urobilinogen, UA <2.0 <2.0 EU/dL    Leukocytes, UA Negative Negative            Imaging Results          CT Abdomen Pelvis With Contrast (In process)      "        [facility] report:   1.  Possible cholelithiasis.  Mildly thickened gallbladder wall.  Correlate clinically for cholecystitis.  2.  Mild to moderate right hydronephrosis.  No ureteral calculus.  3.  Uterine fibroids.  Possible mild pelvic straining, correlate clinically for PID or cystitis      Medications   sodium chloride 0.9% bolus 1,000 mL (0 mLs Intravenous Stopped 19 0000)   ondansetron injection 4 mg (4 mg Intravenous Given 19 2300)   morphine injection 4 mg (4 mg Intravenous Given 19 2300)   iohexol (OMNIPAQUE 350) injection 75 mL (100 mLs Intravenous Given 19 2359)     12:17 AM:  Patient informed of her diagnosis of anemia.  Patient states that she has always had heavy menstrual periods and has been transfused in the past.  She is currently not taking iron supplementation, and does endorse heavy menstruation.  Denies any active vaginal bleeding.  Denies any blood in the stool.  She states that her epigastric pain has completely resolved.       Medical Decision Makin-year-old female presents today complaints of epigastric pain; she reports a known history of gallstones that were identified when she was pregnant many years ago, but states she has never had any known problems arise from it; suspect that her recurrent postprandial nightly episodes of epigastric pain may be related to biliary colic; CT reads "possible cholelithiasis. mildly thickened gallbladder wall.  Correlate clinically for cholecystitis."  On reexamination however, patient is completely pain-free arguing against cholecystitis and suggesting more biliary colic.  We do not have ultrasound here at this facility to further evaluate gallbladder.  Patient was found to be anemic with fibroids and reports regular menorrhagia.  No active bleeding.  Patient denies any known history of peptic ulcer disease or blood in her stool.  She states she has been transfused in the past due to menorrhagia.  Suspect menorrhagia and " fibroids as the source of her anemia.  Patient will require transfusion.  Patient will need transfer for blood transfusion since we do not have the ability to type and cross and transfuse here at this facility.  Patient understands the plan of care including the need for transfer for transfusion.  Patient was offered transfer to [facility] and patient accepted.  Dr. Cotto is the accepting physician.  Patient will be transferred via [facility] with cardiac and blood pressure monitoring en route                      Clinical Impression:   Final diagnoses:  [D64.9] Anemia, unspecified type (Primary)  [D21.9] Fibroids  [K80.50] Biliary colic      Disposition:   Disposition: Placed in Observation  Condition: Stable                        Marek Roberto MD  09/13/19 0147

## 2019-09-13 NOTE — H&P
[facility] Hospital Medicine  History & Physical    Patient Name: Sameko Collette Miller  MRN: [id number]  Admission Date: 9/12/2019  Attending Physician: Marek Roberto MD   Primary Care Provider: Primary Doctor No         Patient information was obtained from patient and ER records.     Subjective:     Principal Problem:Epigastric pain    Chief Complaint:   Chief Complaint   Patient presents with    Abdominal Cramping     epigastric cramping x2 weeks only at night. pt states shes tried gas x and naproxen with no relief.         HPI: 37 yo BF who presented c/o epigastric pain for about 3 weeks. Her pain has no relationship to food and mostly at night when she goes to bed. Per the patient she was told in the past she has gallstones but hasn't had any known issues. She denies any other GI symptoms such as heartburn/indegestion. She also admits to being fatigue and SOB when she over exerts herself. She was found with a H/H 6/21 and is aware of uterine fibroids despite what she describes as a normal menstrual cycle.    History reviewed. No pertinent past medical history.    History reviewed. No pertinent surgical history.    Review of patient's allergies indicates:  No Known Allergies    No current facility-administered medications on file prior to encounter.      No current outpatient medications on file prior to encounter.     Family History     None        Tobacco Use    Smoking status: Never Smoker    Smokeless tobacco: Never Used   Substance and Sexual Activity    Alcohol use: No     Frequency: Never    Drug use: No    Sexual activity: Not on file     Review of Systems   Constitutional: Positive for fatigue.   HENT: Negative.    Eyes: Negative.    Respiratory: Negative.    Cardiovascular: Negative.    Gastrointestinal: Positive for abdominal distention, abdominal pain and nausea.   Endocrine: Negative.    Genitourinary: Negative.    Musculoskeletal: Negative.    Skin: Negative.     Allergic/Immunologic: Negative.    Neurological: Negative.    Hematological: Negative.    Psychiatric/Behavioral: Negative.      Objective:     Vital Signs (Most Recent):  Temp: 98.9 °F (37.2 °C) (09/13/19 0332)  Pulse: 77 (09/13/19 0332)  Resp: 18 (09/13/19 0236)  BP: 117/61 (09/13/19 0332)  SpO2: 100 % (09/13/19 0332) Vital Signs (24h Range):  Temp:  [98.6 °F (37 °C)-98.9 °F (37.2 °C)] 98.9 °F (37.2 °C)  Pulse:  [76-86] 77  Resp:  [18] 18  SpO2:  [100 %] 100 %  BP: (117-146)/(61-86) 117/61     Weight: 71.3 kg (157 lb 3 oz)  Body mass index is 29.7 kg/m².    Physical Exam   Constitutional: She is oriented to person, place, and time. She appears well-developed and well-nourished.   HENT:   Head: Normocephalic and atraumatic.   Right Ear: External ear normal.   Left Ear: External ear normal.   Nose: Nose normal.   Mouth/Throat: Oropharynx is clear and moist. No oropharyngeal exudate.   Eyes: Pupils are equal, round, and reactive to light. Conjunctivae and EOM are normal.   Neck: Normal range of motion. Neck supple.   Cardiovascular: Normal rate, regular rhythm, normal heart sounds and intact distal pulses.   Pulmonary/Chest: Effort normal and breath sounds normal.   Abdominal: Soft. Bowel sounds are normal. She exhibits no distension. There is tenderness in the epigastric area.   Neurological: She is alert and oriented to person, place, and time.   Skin: Skin is warm and dry. Capillary refill takes 2 to 3 seconds.   Psychiatric: She has a normal mood and affect.   Nursing note and vitals reviewed.        CRANIAL NERVES     CN III, IV, VI   Pupils are equal, round, and reactive to light.  Extraocular motions are normal.        Significant Labs:   Recent Lab Results       09/12/19 2259 09/12/19 2254        Albumin 4.6       Alkaline Phosphatase 78       ALT 10       Anion Gap 9       Aniso Moderate       Appearance, UA   Clear     AST 15       Baso # 0.04       Basophil% 0.3       Bilirubin (UA)   Negative      BILIRUBIN TOTAL 0.3  Comment:  For infants and newborns, interpretation of results should be based  on gestational age, weight and in agreement with clinical  observations.  Premature Infant recommended reference ranges:  Up to 24 hours.............<8.0 mg/dL  Up to 48 hours............<12.0 mg/dL  3-5 days..................<15.0 mg/dL  6-29 days.................<15.0 mg/dL         BUN, Bld 7       Calcium 10.1       Chloride 104       CO2 22       Color, UA   Yellow     Creatinine 0.7       Differential Method Automated       eGFR if  >60.0       eGFR if non  >60.0  Comment:  Calculation used to obtain the estimated glomerular filtration  rate (eGFR) is the CKD-EPI equation.          Eos # 0.1       Eosinophil% 0.6       Fragmented Cells Occasional       Glucose 104       Glucose, UA   Negative     Gran # (ANC) 9.9       Gran% 73.6       Hematocrit 22.9       Hemoglobin 6.1       Hypo Moderate       Ketones, UA   Negative     Leukocytes, UA   Negative     Lipase 27       Lymph # 2.7       Lymph% 19.9       MCH 15.4       MCHC 26.6       MCV 58       Mono # 0.8       Mono% 5.6       MPV SEE COMMENT  Comment:  Result not available.       NITRITE UA   Negative     Occult Blood UA   Negative     Ovalocytes Occasional       pH, UA   7.0     Platelets 471       Potassium 3.6       Preg Test, Ur   Negative     PROTEIN TOTAL 8.9       Protein, UA   Negative  Comment:  Recommend a 24 hour urine protein or a urine   protein/creatinine ratio if globulin induced proteinuria is  clinically suspected.       RBC 3.97       RDW 22.0       Sodium 135       Specific Gravity, UA   1.020     Specimen UA   Urine, Clean Catch     UROBILINOGEN UA   <2.0     WBC 13.55             Significant Imaging:   Imaging Results          CT Abdomen Pelvis With Contrast (In process)                   Assessment/Plan:     * Epigastric pain  There is no relationship to food and mostly at night makes me wonder if she may  have GERD. Will try PPI as she hasn't taken anything except gas X.       Anemia  She is symptomatic so will transfuse and repeat H/H.        VTE Risk Mitigation (From admission, onward)    None             Monisha Cotto MD  Department of Hospital Medicine   [facility]

## 2019-11-07 RX ORDER — OMEPRAZOLE 40 MG/1
CAPSULE, DELAYED RELEASE ORAL
Qty: 30 CAPSULE | Refills: 0 | OUTPATIENT
Start: 2019-11-07

## 2020-01-11 RX ORDER — FERROUS SULFATE 325(65) MG
TABLET ORAL
Qty: 60 TABLET | Refills: 1 | OUTPATIENT
Start: 2020-01-11

## 2025-02-13 ENCOUNTER — PATIENT MESSAGE (OUTPATIENT)
Dept: CARDIOLOGY | Facility: CLINIC | Age: 45
End: 2025-02-13
Payer: MEDICAID